# Patient Record
Sex: FEMALE | Race: OTHER | HISPANIC OR LATINO
[De-identification: names, ages, dates, MRNs, and addresses within clinical notes are randomized per-mention and may not be internally consistent; named-entity substitution may affect disease eponyms.]

---

## 2023-03-30 PROBLEM — Z00.00 ENCOUNTER FOR PREVENTIVE HEALTH EXAMINATION: Status: ACTIVE | Noted: 2023-03-30

## 2023-04-04 ENCOUNTER — ASOB RESULT (OUTPATIENT)
Age: 38
End: 2023-04-04

## 2023-04-04 ENCOUNTER — APPOINTMENT (OUTPATIENT)
Dept: ANTEPARTUM | Facility: CLINIC | Age: 38
End: 2023-04-04
Payer: COMMERCIAL

## 2023-04-04 DIAGNOSIS — N91.2 AMENORRHEA, UNSPECIFIED: ICD-10-CM

## 2023-04-04 DIAGNOSIS — O35.1XX0 MATERNAL CARE FOR (SUSPECTED) CHROMOSOMAL ABNORMALITY IN FETUS, NOT APPLICABLE OR UNSPECIFIED: ICD-10-CM

## 2023-04-04 PROCEDURE — 76813 OB US NUCHAL MEAS 1 GEST: CPT

## 2023-04-04 PROCEDURE — 36415 COLL VENOUS BLD VENIPUNCTURE: CPT

## 2023-04-07 LAB
ADDITIONAL US: NORMAL
COMMENTS: AFP: NORMAL
CRL SCAN TWIN B: NORMAL
CRL SCAN: NORMAL
CROWN RUMP LENGTH TWIN B: NORMAL
CROWN RUMP LENGTH: 59.5 MM
DOWN SYNDROME AGE RISK: NORMAL
DOWN SYNDROME INTERPRETATION: NORMAL
DOWN SYNDROME SCREENING RISK: NORMAL
GEST. AGE ON COLLECTION DATE: 12.3 WEEKS
HCG MOM: 1.04
HCG VALUE: 99.6 IU/ML
MATERNAL AGE AT EDD: 36.1 YR
NOTE: AFP: NORMAL
NT MOM TWIN B: NORMAL
NT TWIN B: NORMAL
NUCHAL TRANSLUCENCY (NT): 1.8 MM
NUCHAL TRANSLUCENCY MOM: 1.17
NUMBER OF FETUSES: 1
PAPP-A MOM: 0.86
PAPP-A VALUE: 723.2 NG/ML
RACE: NORMAL
RESULTS AFP: NORMAL
SONOGRAPHER ID#: NORMAL
SUBMIT PART 2 SAMPLE USING: NORMAL
TEST RESULTS: AFP: NORMAL
TRISOMY 18 AGE RISK: NORMAL
TRISOMY 18 INTERPRETATION: NORMAL
TRISOMY 18 SCREENING RISK: NORMAL
WEIGHT AFP: 165 LBS

## 2023-04-25 ENCOUNTER — APPOINTMENT (OUTPATIENT)
Dept: ANTEPARTUM | Facility: CLINIC | Age: 38
End: 2023-04-25
Payer: COMMERCIAL

## 2023-04-25 PROCEDURE — 36415 COLL VENOUS BLD VENIPUNCTURE: CPT

## 2023-04-28 LAB
ADDITIONAL US: NORMAL
AFP MOM: 1.59
AFP VALUE: 45 NG/ML
COLLECTED ON 2: NORMAL
COLLECTED ON: NORMAL
CRL SCAN TWIN B: NORMAL
CRL SCAN: NORMAL
CROWN RUMP LENGTH TWIN B: NORMAL
CROWN RUMP LENGTH: 59.5 MM
DIA MOM: 0.84
DIA VALUE: 130.2 PG/ML
DOWN SYNDROME AGE RISK: NORMAL
DOWN SYNDROME INTERPRETATION: NORMAL
DOWN SYNDROME SCREENING RISK: NORMAL
FIRST TRIMESTER SAMPLE: NORMAL
GEST. AGE ON COLLECTION DATE: 12.3 WEEKS
GESTATIONAL AGE: 15.3 WEEKS
HCG MOM: 1.16
HCG VALUE: 51.1 IU/ML
INSULIN DEP DIABETES: NO
MATERNAL AGE AT EDD: 36.1 YR
NT MOM TWIN B: NORMAL
NT TWIN B: NORMAL
NUCHAL TRANSLUCENCY (NT): 1.8 MM
NUCHAL TRANSLUCENCY MOM: 1.17
NUMBER OF FETUSES: 1
OPEN SPINA BIFIDA: NORMAL
OSB INTERPRETATION: NORMAL
PAPP-A MOM: 0.86
PAPP-A VALUE: 723.2 NG/ML
RACE: NORMAL
SECOND TRIMESTER SAMPLE: NORMAL
SEQUENTIAL 2 COMMENTS: NORMAL
SEQUENTIAL 2 NOTE: NORMAL
SEQUENTIAL 2 RESULTS: NORMAL
SEQUENTIAL 2 TEST RESULTS: NORMAL
SONOGRAPHER ID#: NORMAL
TRISOMY 18 AGE RISK: NORMAL
TRISOMY 18 INTERPRETATION: NORMAL
TRISOMY 18 SCREENING RISK: NORMAL
UE3 MOM: 0.71
UE3 VALUE: 0.55 NG/ML
WEIGHT AFP: 165 LBS
WEIGHT: 161 LBS

## 2023-05-02 ENCOUNTER — APPOINTMENT (OUTPATIENT)
Dept: MATERNAL FETAL MEDICINE | Facility: CLINIC | Age: 38
End: 2023-05-02
Payer: COMMERCIAL

## 2023-05-02 ENCOUNTER — ASOB RESULT (OUTPATIENT)
Age: 38
End: 2023-05-02

## 2023-05-02 VITALS — WEIGHT: 162 LBS | BODY MASS INDEX: 29.81 KG/M2 | HEIGHT: 62 IN

## 2023-05-02 DIAGNOSIS — Z83.3 FAMILY HISTORY OF DIABETES MELLITUS: ICD-10-CM

## 2023-05-02 DIAGNOSIS — Z78.9 OTHER SPECIFIED HEALTH STATUS: ICD-10-CM

## 2023-05-02 PROCEDURE — G0108 DIAB MANAGE TRN  PER INDIV: CPT | Mod: 95

## 2023-05-09 ENCOUNTER — ASOB RESULT (OUTPATIENT)
Age: 38
End: 2023-05-09

## 2023-05-09 ENCOUNTER — APPOINTMENT (OUTPATIENT)
Dept: MATERNAL FETAL MEDICINE | Facility: CLINIC | Age: 38
End: 2023-05-09
Payer: COMMERCIAL

## 2023-05-09 VITALS — WEIGHT: 163 LBS | HEIGHT: 62 IN | BODY MASS INDEX: 30 KG/M2

## 2023-05-09 PROCEDURE — G0108 DIAB MANAGE TRN  PER INDIV: CPT | Mod: 95

## 2023-05-16 ENCOUNTER — APPOINTMENT (OUTPATIENT)
Dept: MATERNAL FETAL MEDICINE | Facility: CLINIC | Age: 38
End: 2023-05-16
Payer: COMMERCIAL

## 2023-05-16 ENCOUNTER — ASOB RESULT (OUTPATIENT)
Age: 38
End: 2023-05-16

## 2023-05-16 PROCEDURE — G0108 DIAB MANAGE TRN  PER INDIV: CPT | Mod: 95

## 2023-05-23 ENCOUNTER — APPOINTMENT (OUTPATIENT)
Dept: MATERNAL FETAL MEDICINE | Facility: CLINIC | Age: 38
End: 2023-05-23
Payer: COMMERCIAL

## 2023-05-23 ENCOUNTER — ASOB RESULT (OUTPATIENT)
Age: 38
End: 2023-05-23

## 2023-05-23 ENCOUNTER — APPOINTMENT (OUTPATIENT)
Dept: ANTEPARTUM | Facility: CLINIC | Age: 38
End: 2023-05-23
Payer: COMMERCIAL

## 2023-05-23 VITALS
OXYGEN SATURATION: 99 % | SYSTOLIC BLOOD PRESSURE: 112 MMHG | DIASTOLIC BLOOD PRESSURE: 70 MMHG | BODY MASS INDEX: 29.08 KG/M2 | RESPIRATION RATE: 16 BRPM | HEART RATE: 82 BPM | WEIGHT: 158 LBS | HEIGHT: 62 IN

## 2023-05-23 DIAGNOSIS — Z3A.20 20 WEEKS GESTATION OF PREGNANCY: ICD-10-CM

## 2023-05-23 DIAGNOSIS — Z78.9 OTHER SPECIFIED HEALTH STATUS: ICD-10-CM

## 2023-05-23 PROCEDURE — 36415 COLL VENOUS BLD VENIPUNCTURE: CPT

## 2023-05-23 PROCEDURE — 76811 OB US DETAILED SNGL FETUS: CPT

## 2023-05-23 PROCEDURE — 99214 OFFICE O/P EST MOD 30 MIN: CPT

## 2023-05-23 PROCEDURE — 76817 TRANSVAGINAL US OBSTETRIC: CPT

## 2023-05-23 RX ORDER — ASPIRIN 81 MG
81 TABLET,CHEWABLE ORAL
Refills: 0 | Status: ACTIVE | COMMUNITY

## 2023-05-23 NOTE — DISCUSSION/SUMMARY
[FreeTextEntry1] : 37-year-old -0-0-2 being seen today at 20 weeks for advanced maternal age and gestational diabetes.\par \par Her obstetrical history is significant for delivery in  and  both liveborn female  weighing 7 pounds delivered at term by normal spontaneous vaginal delivery no problems or complications noted with those pregnancies.\par \par A comprehensive ultrasound was performed today and reveals a single viable intrauterine gestation with size consistent with dates.  No gross or soft markers associated with fetal aneuploidy were noted.  Vital signs today reveal a blood pressure of 112/70 immaturities 158 pounds consistent with a BMI of 28.9 kg.\par \par Advanced maternal age;\par \par Genetic counseling has been performed and report was sent under separate cover.  The patient has had Ultra-Screen evaluation, nips blood tests and sequential blood work performed which all demonstrate low risk for fetal aneuploidy.  Diagnostic testing was discussed and declined.  The patient is on low-dose aspirin taking 1 tablet daily.  She was advised to alternate 1 tablet and 2 tablets until 38 weeks.  This medication has been used to statistically prevent and/or delay the onset and severity of preeclampsia should it occur in patients with certain risk factors.\par \par Gestational diabetes;\par \par Dietary consultation has been performed and report was sent under separate cover.  Evaluation of patient's diabetic flowsheets demonstrates good control of fasting and postprandial blood sugars.  Medical intervention is not recommended.  Hemoglobin A1c was obtained in our office today.  A growth scan in 1 month is recommended.  In addition a fetal echo will be performed next 1 to 2 weeks.  Problems and complications related to diabetic pregnancy including fetal macrosomia, increased risk for operative vaginal delivery and  section, shoulder dystocia with associate morbidity mortality and increased incidence of  intensive care admission were discussed.  All the above was discussed with the patient all of her questions were answered.\par \par COVID-19 vaccination;\par \par COVID-19 vaccination pregnancy was discussed.  The patient completed initial vaccination with the Pfizer vaccine.  Risk and benefits of booster vaccination in pregnancy were discussed and after all the patient's questions were answered the patient has declined booster vaccination in pregnancy.  She understands she has certain comorbidities that put her increased risk for complications with COVID infection.  She will contact your office should she have a COVID infection discussed various treatment options.\par \par Her mother has diabetes.  She has no previous medical or surgical history.  She has no known allergies to medications and denies alcohol, tobacco or drug use.\par \par I spent a total of 36 minutes reviewing the patient's prenatal record, prenatal blood work, outside medical records, previous consultations and ultrasound reports counseling and coordinating care.\par \par Recommendations;\par \par 1.  Continue current ADA diet and home glucose monitoring.\par 2.  Fetal echo in the next 1 to 2 weeks is recommended.\par 3.  Follow-up dietary consultation as scheduled.\par 4.  Hemoglobin A1c obtained in our office today.\par 5.  Growth scan in 1 months recommended.\par 6.  Continue low-dose aspirin.\par 7.  Follow-up maternal-fetal medicine consultation as clinically indicated.

## 2023-05-24 ENCOUNTER — APPOINTMENT (OUTPATIENT)
Dept: ANTEPARTUM | Facility: CLINIC | Age: 38
End: 2023-05-24

## 2023-05-24 LAB
ESTIMATED AVERAGE GLUCOSE: 111 MG/DL
HBA1C MFR BLD HPLC: 5.5 %

## 2023-06-06 ENCOUNTER — ASOB RESULT (OUTPATIENT)
Age: 38
End: 2023-06-06

## 2023-06-06 ENCOUNTER — APPOINTMENT (OUTPATIENT)
Dept: MATERNAL FETAL MEDICINE | Facility: CLINIC | Age: 38
End: 2023-06-06
Payer: COMMERCIAL

## 2023-06-06 VITALS — HEIGHT: 62 IN | BODY MASS INDEX: 28.71 KG/M2 | WEIGHT: 156 LBS

## 2023-06-06 PROCEDURE — G0108 DIAB MANAGE TRN  PER INDIV: CPT | Mod: 95

## 2023-06-13 ENCOUNTER — APPOINTMENT (OUTPATIENT)
Dept: PEDIATRIC CARDIOLOGY | Facility: CLINIC | Age: 38
End: 2023-06-13
Payer: COMMERCIAL

## 2023-06-13 DIAGNOSIS — O99.810 ABNORMAL GLUCOSE COMPLICATING PREGNANCY: ICD-10-CM

## 2023-06-13 DIAGNOSIS — O09.522 SUPERVISION OF ELDERLY MULTIGRAVIDA, SECOND TRIMESTER: ICD-10-CM

## 2023-06-13 DIAGNOSIS — Z3A.23 23 WEEKS GESTATION OF PREGNANCY: ICD-10-CM

## 2023-06-13 DIAGNOSIS — O35.9XX0 MATERNAL CARE FOR (SUSPECTED) FETAL ABNORMALITY AND DAMAGE, UNSPECIFIED, NOT APPLICABLE OR UNSPECIFIED: ICD-10-CM

## 2023-06-13 PROCEDURE — 76827 ECHO EXAM OF FETAL HEART: CPT

## 2023-06-13 PROCEDURE — 93325 DOPPLER ECHO COLOR FLOW MAPG: CPT | Mod: 59

## 2023-06-13 PROCEDURE — 76821 MIDDLE CEREBRAL ARTERY ECHO: CPT

## 2023-06-13 PROCEDURE — 76825 ECHO EXAM OF FETAL HEART: CPT

## 2023-06-13 PROCEDURE — 99204 OFFICE O/P NEW MOD 45 MIN: CPT | Mod: 25

## 2023-06-13 PROCEDURE — 76820 UMBILICAL ARTERY ECHO: CPT

## 2023-06-14 ENCOUNTER — APPOINTMENT (OUTPATIENT)
Dept: MATERNAL FETAL MEDICINE | Facility: CLINIC | Age: 38
End: 2023-06-14
Payer: COMMERCIAL

## 2023-06-14 ENCOUNTER — ASOB RESULT (OUTPATIENT)
Age: 38
End: 2023-06-14

## 2023-06-14 PROCEDURE — 99212 OFFICE O/P EST SF 10 MIN: CPT | Mod: 95

## 2023-06-20 ENCOUNTER — ASOB RESULT (OUTPATIENT)
Age: 38
End: 2023-06-20

## 2023-06-20 ENCOUNTER — APPOINTMENT (OUTPATIENT)
Dept: ANTEPARTUM | Facility: CLINIC | Age: 38
End: 2023-06-20
Payer: COMMERCIAL

## 2023-06-20 PROCEDURE — 76816 OB US FOLLOW-UP PER FETUS: CPT

## 2023-06-27 ENCOUNTER — APPOINTMENT (OUTPATIENT)
Dept: MATERNAL FETAL MEDICINE | Facility: CLINIC | Age: 38
End: 2023-06-27
Payer: COMMERCIAL

## 2023-06-27 ENCOUNTER — ASOB RESULT (OUTPATIENT)
Age: 38
End: 2023-06-27

## 2023-06-27 PROCEDURE — G0108 DIAB MANAGE TRN  PER INDIV: CPT | Mod: 95

## 2023-07-18 ENCOUNTER — APPOINTMENT (OUTPATIENT)
Dept: ANTEPARTUM | Facility: CLINIC | Age: 38
End: 2023-07-18
Payer: COMMERCIAL

## 2023-07-18 ENCOUNTER — ASOB RESULT (OUTPATIENT)
Age: 38
End: 2023-07-18

## 2023-07-18 PROCEDURE — 76816 OB US FOLLOW-UP PER FETUS: CPT

## 2023-07-25 ENCOUNTER — ASOB RESULT (OUTPATIENT)
Age: 38
End: 2023-07-25

## 2023-07-25 ENCOUNTER — APPOINTMENT (OUTPATIENT)
Dept: MATERNAL FETAL MEDICINE | Facility: CLINIC | Age: 38
End: 2023-07-25
Payer: COMMERCIAL

## 2023-07-25 PROCEDURE — G0108 DIAB MANAGE TRN  PER INDIV: CPT | Mod: 95

## 2023-08-15 ENCOUNTER — APPOINTMENT (OUTPATIENT)
Dept: ANTEPARTUM | Facility: CLINIC | Age: 38
End: 2023-08-15
Payer: COMMERCIAL

## 2023-08-15 ENCOUNTER — ASOB RESULT (OUTPATIENT)
Age: 38
End: 2023-08-15

## 2023-08-15 PROCEDURE — 76816 OB US FOLLOW-UP PER FETUS: CPT

## 2023-08-22 ENCOUNTER — APPOINTMENT (OUTPATIENT)
Dept: MATERNAL FETAL MEDICINE | Facility: CLINIC | Age: 38
End: 2023-08-22
Payer: COMMERCIAL

## 2023-08-22 ENCOUNTER — ASOB RESULT (OUTPATIENT)
Age: 38
End: 2023-08-22

## 2023-08-22 VITALS — BODY MASS INDEX: 27.97 KG/M2 | HEIGHT: 62 IN | WEIGHT: 152 LBS

## 2023-08-22 PROCEDURE — G0108 DIAB MANAGE TRN  PER INDIV: CPT | Mod: 95

## 2023-09-12 ENCOUNTER — ASOB RESULT (OUTPATIENT)
Age: 38
End: 2023-09-12

## 2023-09-12 ENCOUNTER — APPOINTMENT (OUTPATIENT)
Dept: ANTEPARTUM | Facility: CLINIC | Age: 38
End: 2023-09-12
Payer: COMMERCIAL

## 2023-09-12 PROCEDURE — 76816 OB US FOLLOW-UP PER FETUS: CPT

## 2023-09-12 PROCEDURE — 76819 FETAL BIOPHYS PROFIL W/O NST: CPT

## 2023-09-19 ENCOUNTER — ASOB RESULT (OUTPATIENT)
Age: 38
End: 2023-09-19

## 2023-09-19 ENCOUNTER — APPOINTMENT (OUTPATIENT)
Dept: ANTEPARTUM | Facility: CLINIC | Age: 38
End: 2023-09-19
Payer: COMMERCIAL

## 2023-09-19 ENCOUNTER — APPOINTMENT (OUTPATIENT)
Dept: MATERNAL FETAL MEDICINE | Facility: CLINIC | Age: 38
End: 2023-09-19
Payer: COMMERCIAL

## 2023-09-19 PROCEDURE — 76818 FETAL BIOPHYS PROFILE W/NST: CPT

## 2023-09-19 PROCEDURE — G0108 DIAB MANAGE TRN  PER INDIV: CPT | Mod: 95

## 2023-09-26 ENCOUNTER — APPOINTMENT (OUTPATIENT)
Dept: ANTEPARTUM | Facility: CLINIC | Age: 38
End: 2023-09-26

## 2023-10-03 ENCOUNTER — APPOINTMENT (OUTPATIENT)
Dept: ANTEPARTUM | Facility: CLINIC | Age: 38
End: 2023-10-03

## 2023-10-10 ENCOUNTER — APPOINTMENT (OUTPATIENT)
Dept: ANTEPARTUM | Facility: CLINIC | Age: 38
End: 2023-10-10

## 2023-10-10 ENCOUNTER — TRANSCRIPTION ENCOUNTER (OUTPATIENT)
Age: 38
End: 2023-10-10

## 2023-10-10 ENCOUNTER — INPATIENT (INPATIENT)
Facility: HOSPITAL | Age: 38
LOS: 1 days | Discharge: HOME | End: 2023-10-12
Attending: OBSTETRICS & GYNECOLOGY | Admitting: OBSTETRICS & GYNECOLOGY
Payer: COMMERCIAL

## 2023-10-10 VITALS
HEART RATE: 103 BPM | DIASTOLIC BLOOD PRESSURE: 78 MMHG | RESPIRATION RATE: 18 BRPM | TEMPERATURE: 98 F | SYSTOLIC BLOOD PRESSURE: 117 MMHG

## 2023-10-10 DIAGNOSIS — O99.810 ABNORMAL GLUCOSE COMPLICATING PREGNANCY: ICD-10-CM

## 2023-10-10 LAB
ABO RH CONFIRMATION: SIGNIFICANT CHANGE UP
BASOPHILS # BLD AUTO: 0.04 K/UL — SIGNIFICANT CHANGE UP (ref 0–0.2)
BASOPHILS NFR BLD AUTO: 0.4 % — SIGNIFICANT CHANGE UP (ref 0–2)
BLD GP AB SCN SERPL QL: SIGNIFICANT CHANGE UP
EOSINOPHIL # BLD AUTO: 0.02 K/UL — SIGNIFICANT CHANGE UP (ref 0–0.5)
EOSINOPHIL NFR BLD AUTO: 0.2 % — SIGNIFICANT CHANGE UP (ref 0–6)
HCT VFR BLD CALC: 43.4 % — SIGNIFICANT CHANGE UP (ref 34.5–45)
HGB BLD-MCNC: 14.9 G/DL — SIGNIFICANT CHANGE UP (ref 11.5–15.5)
IMM GRANULOCYTES NFR BLD AUTO: 0.7 % — SIGNIFICANT CHANGE UP (ref 0–0.9)
LYMPHOCYTES # BLD AUTO: 2.23 K/UL — SIGNIFICANT CHANGE UP (ref 1–3.3)
LYMPHOCYTES # BLD AUTO: 24.4 % — SIGNIFICANT CHANGE UP (ref 13–44)
MCHC RBC-ENTMCNC: 33.2 PG — SIGNIFICANT CHANGE UP (ref 27–34)
MCHC RBC-ENTMCNC: 34.3 GM/DL — SIGNIFICANT CHANGE UP (ref 32–36)
MCV RBC AUTO: 96.7 FL — SIGNIFICANT CHANGE UP (ref 80–100)
MONOCYTES # BLD AUTO: 0.57 K/UL — SIGNIFICANT CHANGE UP (ref 0–0.9)
MONOCYTES NFR BLD AUTO: 6.2 % — SIGNIFICANT CHANGE UP (ref 2–14)
NEUTROPHILS # BLD AUTO: 6.23 K/UL — SIGNIFICANT CHANGE UP (ref 1.8–7.4)
NEUTROPHILS NFR BLD AUTO: 68.1 % — SIGNIFICANT CHANGE UP (ref 43–77)
PLATELET # BLD AUTO: 247 K/UL — SIGNIFICANT CHANGE UP (ref 150–400)
RBC # BLD: 4.49 M/UL — SIGNIFICANT CHANGE UP (ref 3.8–5.2)
RBC # FLD: 13 % — SIGNIFICANT CHANGE UP (ref 10.3–14.5)
WBC # BLD: 9.15 K/UL — SIGNIFICANT CHANGE UP (ref 3.8–10.5)
WBC # FLD AUTO: 9.15 K/UL — SIGNIFICANT CHANGE UP (ref 3.8–10.5)

## 2023-10-10 PROCEDURE — 88302 TISSUE EXAM BY PATHOLOGIST: CPT | Mod: 26

## 2023-10-10 PROCEDURE — 88307 TISSUE EXAM BY PATHOLOGIST: CPT | Mod: 26

## 2023-10-10 PROCEDURE — 58605 DIVISION OF FALLOPIAN TUBE: CPT | Mod: GC

## 2023-10-10 RX ORDER — PRAMOXINE HYDROCHLORIDE 150 MG/15G
1 AEROSOL, FOAM RECTAL EVERY 4 HOURS
Refills: 0 | Status: DISCONTINUED | OUTPATIENT
Start: 2023-10-10 | End: 2023-10-12

## 2023-10-10 RX ORDER — HYDROCORTISONE 1 %
1 OINTMENT (GRAM) TOPICAL EVERY 6 HOURS
Refills: 0 | Status: DISCONTINUED | OUTPATIENT
Start: 2023-10-10 | End: 2023-10-12

## 2023-10-10 RX ORDER — BENZOCAINE 10 %
1 GEL (GRAM) MUCOUS MEMBRANE EVERY 6 HOURS
Refills: 0 | Status: DISCONTINUED | OUTPATIENT
Start: 2023-10-10 | End: 2023-10-12

## 2023-10-10 RX ORDER — DIBUCAINE 1 %
1 OINTMENT (GRAM) RECTAL EVERY 6 HOURS
Refills: 0 | Status: DISCONTINUED | OUTPATIENT
Start: 2023-10-10 | End: 2023-10-12

## 2023-10-10 RX ORDER — TETANUS TOXOID, REDUCED DIPHTHERIA TOXOID AND ACELLULAR PERTUSSIS VACCINE, ADSORBED 5; 2.5; 8; 8; 2.5 [IU]/.5ML; [IU]/.5ML; UG/.5ML; UG/.5ML; UG/.5ML
0.5 SUSPENSION INTRAMUSCULAR ONCE
Refills: 0 | Status: DISCONTINUED | OUTPATIENT
Start: 2023-10-10 | End: 2023-10-12

## 2023-10-10 RX ORDER — CITRIC ACID/SODIUM CITRATE 300-500 MG
30 SOLUTION, ORAL ORAL ONCE
Refills: 0 | Status: DISCONTINUED | OUTPATIENT
Start: 2023-10-10 | End: 2023-10-10

## 2023-10-10 RX ORDER — OXYTOCIN 10 UNIT/ML
41.67 VIAL (ML) INJECTION
Qty: 20 | Refills: 0 | Status: DISCONTINUED | OUTPATIENT
Start: 2023-10-10 | End: 2023-10-12

## 2023-10-10 RX ORDER — IBUPROFEN 200 MG
600 TABLET ORAL EVERY 6 HOURS
Refills: 0 | Status: COMPLETED | OUTPATIENT
Start: 2023-10-10 | End: 2024-09-07

## 2023-10-10 RX ORDER — OXYTOCIN 10 UNIT/ML
333.33 VIAL (ML) INJECTION
Qty: 20 | Refills: 0 | Status: COMPLETED | OUTPATIENT
Start: 2023-10-10 | End: 2023-10-10

## 2023-10-10 RX ORDER — SODIUM CHLORIDE 9 MG/ML
3 INJECTION INTRAMUSCULAR; INTRAVENOUS; SUBCUTANEOUS EVERY 8 HOURS
Refills: 0 | Status: DISCONTINUED | OUTPATIENT
Start: 2023-10-10 | End: 2023-10-12

## 2023-10-10 RX ORDER — SIMETHICONE 80 MG/1
80 TABLET, CHEWABLE ORAL EVERY 4 HOURS
Refills: 0 | Status: DISCONTINUED | OUTPATIENT
Start: 2023-10-10 | End: 2023-10-12

## 2023-10-10 RX ORDER — DIPHENHYDRAMINE HCL 50 MG
25 CAPSULE ORAL EVERY 6 HOURS
Refills: 0 | Status: DISCONTINUED | OUTPATIENT
Start: 2023-10-10 | End: 2023-10-12

## 2023-10-10 RX ORDER — CHLORHEXIDINE GLUCONATE 213 G/1000ML
1 SOLUTION TOPICAL DAILY
Refills: 0 | Status: DISCONTINUED | OUTPATIENT
Start: 2023-10-10 | End: 2023-10-10

## 2023-10-10 RX ORDER — ACETAMINOPHEN 500 MG
2 TABLET ORAL
Qty: 40 | Refills: 0
Start: 2023-10-10 | End: 2023-10-14

## 2023-10-10 RX ORDER — KETOROLAC TROMETHAMINE 30 MG/ML
30 SYRINGE (ML) INJECTION ONCE
Refills: 0 | Status: DISCONTINUED | OUTPATIENT
Start: 2023-10-10 | End: 2023-10-10

## 2023-10-10 RX ORDER — ACETAMINOPHEN 500 MG
975 TABLET ORAL
Refills: 0 | Status: DISCONTINUED | OUTPATIENT
Start: 2023-10-10 | End: 2023-10-12

## 2023-10-10 RX ORDER — MAGNESIUM HYDROXIDE 400 MG/1
30 TABLET, CHEWABLE ORAL
Refills: 0 | Status: DISCONTINUED | OUTPATIENT
Start: 2023-10-10 | End: 2023-10-12

## 2023-10-10 RX ORDER — SODIUM CHLORIDE 9 MG/ML
1000 INJECTION, SOLUTION INTRAVENOUS
Refills: 0 | Status: DISCONTINUED | OUTPATIENT
Start: 2023-10-10 | End: 2023-10-10

## 2023-10-10 RX ORDER — IBUPROFEN 200 MG
600 TABLET ORAL EVERY 6 HOURS
Refills: 0 | Status: DISCONTINUED | OUTPATIENT
Start: 2023-10-10 | End: 2023-10-12

## 2023-10-10 RX ORDER — IBUPROFEN 200 MG
1 TABLET ORAL
Qty: 20 | Refills: 0
Start: 2023-10-10 | End: 2023-10-14

## 2023-10-10 RX ORDER — AER TRAVELER 0.5 G/1
1 SOLUTION RECTAL; TOPICAL EVERY 4 HOURS
Refills: 0 | Status: DISCONTINUED | OUTPATIENT
Start: 2023-10-10 | End: 2023-10-12

## 2023-10-10 RX ORDER — LANOLIN
1 OINTMENT (GRAM) TOPICAL EVERY 6 HOURS
Refills: 0 | Status: DISCONTINUED | OUTPATIENT
Start: 2023-10-10 | End: 2023-10-12

## 2023-10-10 RX ADMIN — Medication 30 MILLIGRAM(S): at 18:01

## 2023-10-10 RX ADMIN — Medication 600 MILLIGRAM(S): at 23:33

## 2023-10-10 RX ADMIN — Medication 975 MILLIGRAM(S): at 21:01

## 2023-10-10 RX ADMIN — Medication 1000 MILLIUNIT(S)/MIN: at 18:00

## 2023-10-10 RX ADMIN — SODIUM CHLORIDE 125 MILLILITER(S): 9 INJECTION, SOLUTION INTRAVENOUS at 12:30

## 2023-10-10 NOTE — OB RN DELIVERY SUMMARY - NSSELHIDDEN_OBGYN_ALL_OB_FT
[NS_DeliveryAttending1_OBGYN_ALL_OB_FT:[email or web address]=],[NS_DeliveryRN_OBGYN_ALL_OB_FT:[id number]=]

## 2023-10-10 NOTE — OB RN DELIVERY SUMMARY - NS_NEWBORNAMRN_OBGYN_ALL_OB_FT
[id number] Ketoconazole Counseling:   Patient counseled regarding improving absorption with orange juice.  Adverse effects include but are not limited to breast enlargement, headache, diarrhea, nausea, upset stomach, liver function test abnormalities, taste disturbance, and stomach pain.  There is a rare possibility of liver failure that can occur when taking ketoconazole. The patient understands that monitoring of LFTs may be required, especially at baseline. The patient verbalized understanding of the proper use and possible adverse effects of ketoconazole.  All of the patient's questions and concerns were addressed.

## 2023-10-10 NOTE — DISCHARGE NOTE OB - CARE PROVIDER_API CALL
Jackson Prieto  Obstetrics and Gynecology  [facility] [place] [address]  Phone: [phone]  Fax: [phone]  Established Patient  Follow Up Time: 2 weeks

## 2023-10-10 NOTE — OB PROVIDER H&P - NSHPPHYSICALEXAM_GEN_ALL_CORE
OBJECTIVE:  Physical Exam:  Gen: NAD, well-appearing, AAOx3   Abd: Soft, gravid  Ext: non-tender, non-edematous  TO BE COMPLETED  SVE:    Bedside sono:  FHT:  [place]: OBJECTIVE:  Physical Exam:  Gen: NAD, well-appearing, AAOx3   Abd: Soft, gravid  Ext: non-tender, non-edematous  TO BE COMPLETED  SVE: 0.5/50/-3    Bedside sono: cephalic, placenta fundal/posterior  FHT: Baseline 140bpm, moderate variability, +accel, - decel  [place]: No ctx on monitor

## 2023-10-10 NOTE — DISCHARGE NOTE OB - MEDICATION SUMMARY - MEDICATIONS TO TAKE
I will START or STAY ON the medications listed below when I get home from the hospital:     mg oral tablet  -- 1 tab(s) by mouth every 6 hours  -- Indication: For As needed for pain    acetaminophen 500 mg oral tablet  -- 2 tab(s) by mouth every 6 hours  -- Indication: For As needed for pain

## 2023-10-10 NOTE — OB PROVIDER DELIVERY SUMMARY - NSPROVIDERDELIVERYNOTE_OBGYN_ALL_OB_FT
Patient noted to be fully dilated, and after a period of pushing, patient was prepped and draped for delivery. In conjunction with maternal effort, she delivered a viable male infant. Head delivered FLAKO, no nuchal cord. After 30 sec, delayed cord clamping was performed then  was provided to parent for skin-to-skin. Cord blood collected for blood gas and G6PD deficiency testing. Placenta delivered spontaneously and intact, no gross pathology noted, placenta sent to pathology for SGA. Perineum and vagina were inspected and no laceration was noted. Excellent hemostasis obtained.     EBL 132cc    Sex: M, Delivery Time: 1726,  Weight: 2995g, APGARs: 9/9

## 2023-10-10 NOTE — DISCHARGE NOTE OB - NS MD DC FALL RISK RISK
For information on Fall & Injury Prevention, visit: [email or web address] OR  [email or web address] OR  https://www.cdc.gov/steadi/patient.html

## 2023-10-10 NOTE — OB NEONATOLOGY/PEDIATRICIAN DELIVERY SUMMARY - NSPEDSNEONOTESA_OBGYN_ALL_OB_FT
Called by Dr. Prieto to attend this  at 40 weeks for meconium and cat 2 tracing. Mother is a 37yo  with glucose intolerance, serologies negative, GBS negative, blood type O+. Baby emerged with good tone, cry with stimulation, cord clamped at 30 seconds and brought to warmer, warmed, dried and stimulated, Apgars 9/9.

## 2023-10-10 NOTE — OB PROVIDER H&P - ASSESSMENT
A/P: MANINDER MICHAUD is a 38 year old  at 40w by LMP/1st trimester US who presents to L&D for elective IOL at term.   - Admit to L&D  - Consent  - Admission labs ordered  - IV fluids  - Labor: Intact  - Fetus: Cat I tracing. Continuous toco and fetal monitoring.   - GBS: Negative    to be Discussed with Dr. Prieto A/P: MANINDER MICHAUD is a 38 year old  at 40w by LMP/1st trimester US who presents to L&D for elective IOL at term.   - Admit to L&D  - Consent  - Admission labs ordered  - IV fluids  - Labor: Intact  - Fetus: Cat I tracing. Continuous toco and fetal monitoring.   - GBS: Negative  - initiate induction with Vaginal Cytotec and Cook Balloon    Discussed with Dr. Prieto

## 2023-10-10 NOTE — OB PROVIDER LABOR PROGRESS NOTE - NS_OBIHIFHRDETAILS_OBGYN_ALL_OB_FT
Baseline 150bpm,moderate variability, +accel, 1 isolated decel
Baseline 130bpm, moderate variability, +accel, -decel
135bpm, moderate variability, +Acels no decels

## 2023-10-10 NOTE — OB PROVIDER LABOR PROGRESS NOTE - NS_SUBJECTIVE/OBJECTIVE_OBGYN_ALL_OB_FT
Pt seen bedside
Patient seen and examined for AROM
Patient seen and examined for placement of VCyto1 and CB

## 2023-10-10 NOTE — OB RN PATIENT PROFILE - FALL HARM RISK - UNIVERSAL INTERVENTIONS
Bed in lowest position, wheels locked, appropriate side rails in place/Call bell, personal items and telephone in reach/Instruct patient to call for assistance before getting out of bed or chair/Non-slip footwear when patient is out of bed/[place] to call system/Physically safe environment - no spills, clutter or unnecessary equipment/Purposeful Proactive Rounding/Room/bathroom lighting operational, light cord in reach

## 2023-10-10 NOTE — OB PROVIDER LABOR PROGRESS NOTE - ASSESSMENT
Exam as above  CB balloon placed 80/80  Vycto#1 placed  
Exam as above  Patient AROM revealed minimal fluid, meconium stained  Patient hayde without additional induction agents, continue to monitor    
cook balloon out in vagina, deflated and removed  4/80/-3  c/w current management

## 2023-10-10 NOTE — OB PROVIDER DELIVERY SUMMARY - NSLOWPPHRISK_OBGYN_A_OB
No previous uterine incision/[place] Pregnancy/Less than or equal to 4 previous vaginal births/No known bleeding disorder/No history of postpartum hemorrhage/No other PPH risks indicated

## 2023-10-10 NOTE — OB PROVIDER H&P - HISTORY OF PRESENT ILLNESS
Patient is a 38 year old  at 40w by LMP/1st trimester US who presents to L&D for elective IOL at term.      FRANKI:  10/10/2023   LMP: 1/3/2023     Pregnancy course:   Glucose intolerance   AMA     Obhx: NSVDx2 term 2005; 2010   Gynhx: Denies   Pmhx: Denies   Pshx: Denies   Meds: PNV, ASA   Allergies: NKDA   Social Hx: Denies     Ultrasound:  Vertex posterior   EFW: 2517g    PPBC:BTL consents signed   Patient is a 38 year old  at 40w by LMP/1st trimester US who presents to L&D for elective IOL at term. Patient denies any contractions, VB, LOF, has fetal movement. She has no abdominal pain, cp, SOB at this time. She denies any history of hemorrhage in her previous pregnancies. She reports her home FS have been in the 80s-100s, denies any weakness, fevers, chills.      FARNKI: 10/10/2023   LMP: 1/3/2023   Patient receives care at [facility]  Pregnancy course:   Glucose intolerance   AMA     Obhx: NSVDx2 term 2005; 2010, uncomplicated  Gynhx: Denies   Pmhx: Denies   Pshx: Denies   Meds: PNV, ASA   Allergies: NKDA   Social Hx: Denies     Ultrasound:  Vertex posterior   EFW: 2517g    PPBC:BTL consents signed

## 2023-10-10 NOTE — DISCHARGE NOTE OB - PATIENT PORTAL LINK FT
You can access the FollowMyHealth Patient Portal offered by [facility] by registering at the following website: [email or web address]. By joining [facility]’s FollowMyHealth portal, you will also be able to view your health information using other applications (apps) compatible with our system.

## 2023-10-10 NOTE — OB RN DELIVERY SUMMARY - NS_SEPSISRSKCALC_OBGYN_ALL_OB_FT
EOS calculated successfully. EOS Risk Factor: 0.5/1000 live births ([facility] national incidence); GA=40w;Temp=98.24; ROM=0.317; GBS='Negative'; Antibiotics='No antibiotics or any antibiotics < 2 hrs prior to birth'

## 2023-10-10 NOTE — OB RN DELIVERY SUMMARY - NS_PLACENTA_OBGYN_ALL_OB_DT
"-Coronavirus (COVID-19) Notification    Pt stated someone called him already, he knows about 10d isolation, touched base about his proc w/ doctor. Didn't have questions for me, other then some remarks of \"seems like everything is changing all the time and nobody really knows anything about this...\" I apologized for my call, mentioned to him to make sure he contact his pcp for any further concerns/questions he may have. Pt understood, stated he had already looked at his mychart.    Caller Name (Patient, parent, daughter/son, grandparent, etc)  Pt    Reason for call  Notify of Positive Coronavirus (COVID-19) lab results, assess symptoms,  review [facility] recommendations    Lab Result    Lab test:  2019-nCoV rRt-PCR or SARS-CoV-2 PCR    Oropharyngeal AND/OR nasopharyngeal swabs is POSITIVE for 2019-nCoV RNA/SARS-COV-2 PCR (COVID-19 virus)    RN Recommendations/Instructions per [facility] Coronavirus COVID-19 recommendations    Brief introduction script  Introduce self then review script:  \"I am calling on behalf of [email or web address].  We were notified that your Coronavirus test (COVID-19) for was POSITIVE for the virus.  I have some information to relay to you but first I wanted to mention that the [place] Dept of Health will be contacting you shortly [it's possible MD already called Patient] to talk to you more about how you are feeling and other people you have had contact with who might now also have the virus.  Also, [facility] is Partnering with the [facility] for Covid-19 research, you may be contacted directly by research staff.\"    Assessment (Inquire about Patient's current symptoms)   Assessment   Current Symptoms at time of phone call: (if no symptoms, document No symptoms] none   Symptoms onset (if applicable) n/a     If at time of call, Patients symptoms hare worsened, the Patient should contact 911 or have someone drive them to Emergency Dept promptly:      If Patient calling 911, " "inform 911 personal that you have tested positive for the Coronavirus (COVID-19).  Place mask on and await 911 to arrive.    If Emergency Dept, If possible, please have another adult drive you to the Emergency Dept but you need to wear mask when in contact with other people.      Monoclonal Antibody Administration    You may be eligible to receive a new treatment with a monoclonal antibody for preventing hospitalization in patients at high risk for complications from COVID-19.   This medication is still experimental and available on a limited basis; it is given through an IV and must be given at an infusion center. Please note that not all people who are eligible will receive the medication since it is in limited supply.     Are you interested in being considered for this medication?  No.   Does the patient fit the criteria: No asx    If patient qualifies based on above criteria:  \"You will be contacted if you are selected to receive this treatment in the next 1-2 business days.   This is time sensitive and if you are not selected in the next 1-2 business days, you will not receive the medication.  If you do not receive a call to schedule, you have not been selected.\"      Review information with Patient    Your result was positive. This means you have COVID-19 (coronavirus).  We have sent you a letter that reviews the information that I'll be reviewing with you now.    How can I protect others?    If you have symptoms: stay home and away from others (self-isolate) until:    You've had no fever--and no medicine that reduces fever--for 1 full day (24 hours). And       Your other symptoms have gotten better. For example, your cough or breathing has improved. And     At least 10 days have passed since your symptoms started. (If you've been told by a doctor that you have a weak immune system, wait 20 days.)     If you don't have symptoms: Stay home and away from others (self-isolate) until at least 10 days have passed " since your first positive COVID-19 test. (Date test collected)    During this time:    Stay in your own room, including for meals. Use your own bathroom if you can.    Stay away from others in your home. No hugging, kissing or shaking hands. No visitors.     Don't go to work, school or anywhere else.     Clean  high touch  surfaces often (doorknobs, counters, handles, etc.). Use a household cleaning spray or wipes. You'll find a full list on the EPA website at www.epa.gov/pesticide-registration/list-n-disinfectants-use-against-sars-cov-2.     Cover your mouth and nose with a mask, tissue or other face covering to avoid spreading germs.    Wash your hands and face often with soap and water.    Make a list of people you have been in close contact with recently, even if either of you wore a face covering.   ; Start your list from 2 days before you became ill or had a positive test.  ; Include anyone that was within 6 feet of you for a cumulative total of 15 minutes or more in 24 hours. (Example: if you sat next to Gabriel for 5 minutes in the morning and 10 minutes in the afternoon, then you were in close contact for 15 minutes total that day. Gabriel would be added to your list.)    A public health worker will call or text you. It is important that you answer. They will ask you questions about possible exposures to COVID-19, such as people you have been in direct contact with and places you have visited.    Tell the people on your list that you have COVID-19; they should stay away from others for 14 days starting from the last time they were in contact with you (unless you are told something different from a public health worker).     Caregivers in these groups are at risk for severe illness due to COVID-19:  o People 65 years and older  o People who live in a nursing home or long-term care facility  o People with chronic disease (lung, heart, cancer, diabetes, kidney, liver, immunologic)  o People who have a weakened  immune system, including those who:  - Are in cancer treatment  - Take medicine that weakens the immune system, such as corticosteroids  - Had a bone marrow or organ transplant  - Have an immune deficiency  - Have poorly controlled HIV or AIDS  - Are obese (body mass index of 40 or higher)  - Smoke regularly    Caregivers should wear gloves while washing dishes, handling laundry and cleaning bedrooms and bathrooms.    Wash and dry laundry with special caution. Don't shake dirty laundry, and use the warmest water setting you can.    If you have a weakened immune system, ask your doctor about other actions you should take.    For more tips, go to www.cdc.gov/coronavirus/2019-ncov/downloads/10Things.pdf.    You should not go back to work until you meet the guidelines above for ending your home isolation. You don't need to be retested for COVID-19 before going back to work--studies show that you won't spread the virus if it's been at least 10 days since your symptoms started (or 20 days, if you have a weak immune system).    Employers: This document serves as formal notice of your employee's medical guidelines for going back to work. They must meet the above guidelines before going back to work in person.    How can I take care of myself?    1. Get lots of rest. Drink extra fluids (unless a doctor has told you not to).    2. Take Tylenol (acetaminophen) for fever or pain. If you have liver or kidney problems, ask your family doctor if it's okay to take Tylenol.     Take either:     650 mg (two 325 mg pills) every 4 to 6 hours, or     1,000 mg (two 500 mg pills) every 8 hours as needed.     Note: Don't take more than 3,000 mg in one day. Acetaminophen is found in many medicines (both prescribed and over-the-counter medicines). Read all labels to be sure you don't take too much.    For children, check the Tylenol bottle for the right dose (based on their age or weight).    3. If you have other health problems (like  cancer, heart failure, an organ transplant or severe kidney disease): Call your specialty clinic if you don't feel better in the next 2 days.    4. Know when to call 911: Emergency warning signs include:    Trouble breathing or shortness of breath    Pain or pressure in the chest that doesn't go away    Feeling confused like you haven't felt before, or not being able to wake up    Bluish-colored lips or face    5. Sign up for [employer]. We know it's scary to hear that you have COVID-19. We want to track your symptoms to make sure you're okay over the next 2 weeks. Please look for an email from [employer]--this is a free, online program that we'll use to keep in touch. To sign up, follow the link in the email. Learn more at [email or web address].    Where can I get more information?    [facility]: [email or web address]    Coronavirus Basics: www.health.[facility].[place]./diseases/coronavirus/basics.html    What to Do If You're Sick: www.cdc.gov/coronavirus/2019-ncov/about/steps-when-sick.html    Ending Home Isolation: www.cdc.gov/coronavirus/2019-ncov/hcp/disposition-in-home-patients.html     Caring for Someone with COVID-19: www.cdc.gov/coronavirus/2019-ncov/if-you-are-sick/care-for-someone.html     [facility] clinical trials (COVID-19 research studies): [email or web address].[email or web address]/n-clinical-[email or web address]     A Positive COVID-19 letter will be sent via [employer] or the mail. (Exception, no letters sent to Presurgerical/Preprocedure Patients)    Radha Parikh     10-Oct-2023 17:30

## 2023-10-11 ENCOUNTER — TRANSCRIPTION ENCOUNTER (OUTPATIENT)
Age: 38
End: 2023-10-11

## 2023-10-11 LAB
HCT VFR BLD CALC: 38.3 % — SIGNIFICANT CHANGE UP (ref 34.5–45)
HGB BLD-MCNC: 13.1 G/DL — SIGNIFICANT CHANGE UP (ref 11.5–15.5)

## 2023-10-11 RX ORDER — FAMOTIDINE 10 MG/ML
20 INJECTION INTRAVENOUS ONCE
Refills: 0 | Status: COMPLETED | OUTPATIENT
Start: 2023-10-11 | End: 2023-10-11

## 2023-10-11 RX ORDER — CITRIC ACID/SODIUM CITRATE 300-500 MG
30 SOLUTION, ORAL ORAL ONCE
Refills: 0 | Status: COMPLETED | OUTPATIENT
Start: 2023-10-11 | End: 2023-10-11

## 2023-10-11 RX ORDER — SODIUM CHLORIDE 9 MG/ML
1000 INJECTION, SOLUTION INTRAVENOUS ONCE
Refills: 0 | Status: COMPLETED | OUTPATIENT
Start: 2023-10-11 | End: 2023-10-11

## 2023-10-11 RX ORDER — SODIUM CHLORIDE 9 MG/ML
1000 INJECTION, SOLUTION INTRAVENOUS
Refills: 0 | Status: DISCONTINUED | OUTPATIENT
Start: 2023-10-11 | End: 2023-10-12

## 2023-10-11 RX ADMIN — Medication 975 MILLIGRAM(S): at 17:17

## 2023-10-11 RX ADMIN — Medication 975 MILLIGRAM(S): at 09:41

## 2023-10-11 RX ADMIN — SODIUM CHLORIDE 1000 MILLILITER(S): 9 INJECTION, SOLUTION INTRAVENOUS at 12:10

## 2023-10-11 RX ADMIN — SODIUM CHLORIDE 3 MILLILITER(S): 9 INJECTION INTRAMUSCULAR; INTRAVENOUS; SUBCUTANEOUS at 12:19

## 2023-10-11 RX ADMIN — Medication 975 MILLIGRAM(S): at 10:08

## 2023-10-11 RX ADMIN — Medication 30 MILLILITER(S): at 13:36

## 2023-10-11 RX ADMIN — Medication 600 MILLIGRAM(S): at 20:51

## 2023-10-11 RX ADMIN — SODIUM CHLORIDE 125 MILLILITER(S): 9 INJECTION, SOLUTION INTRAVENOUS at 13:46

## 2023-10-11 RX ADMIN — FAMOTIDINE 20 MILLIGRAM(S): 10 INJECTION INTRAVENOUS at 13:36

## 2023-10-11 RX ADMIN — Medication 975 MILLIGRAM(S): at 02:30

## 2023-10-11 RX ADMIN — Medication 600 MILLIGRAM(S): at 05:55

## 2023-10-11 NOTE — OB RN INTRAOPERATIVE NOTE - NSSELHIDDEN_OBGYN_ALL_OB_FT
[NS_DeliveryAttending1_OBGYN_ALL_OB_FT:[email or web address]],[NS_DeliveryRN_OBGYN_ALL_OB_FT:[id number]]

## 2023-10-11 NOTE — OB RN INTRAOPERATIVE NOTE - NSOBSELHIDDEN_OBGYN_ALL_OB_FT
[NSOBAttendingProcedure1_OBGYN_ALL_OB_FT:[email or web address]=],[NSRNCirculatorProcedure1_OBGYN_ALL_OB_FT:[id number]=]

## 2023-10-12 VITALS
RESPIRATION RATE: 18 BRPM | SYSTOLIC BLOOD PRESSURE: 102 MMHG | HEART RATE: 69 BPM | TEMPERATURE: 98 F | OXYGEN SATURATION: 95 % | DIASTOLIC BLOOD PRESSURE: 63 MMHG

## 2023-10-12 PROCEDURE — 85025 COMPLETE CBC W/AUTO DIFF WBC: CPT

## 2023-10-12 PROCEDURE — 85018 HEMOGLOBIN: CPT

## 2023-10-12 PROCEDURE — 86850 RBC ANTIBODY SCREEN: CPT

## 2023-10-12 PROCEDURE — 85014 HEMATOCRIT: CPT

## 2023-10-12 PROCEDURE — 85027 COMPLETE CBC AUTOMATED: CPT

## 2023-10-12 PROCEDURE — 88307 TISSUE EXAM BY PATHOLOGIST: CPT

## 2023-10-12 PROCEDURE — 88302 TISSUE EXAM BY PATHOLOGIST: CPT

## 2023-10-12 PROCEDURE — 86900 BLOOD TYPING SEROLOGIC ABO: CPT

## 2023-10-12 PROCEDURE — 86901 BLOOD TYPING SEROLOGIC RH(D): CPT

## 2023-10-12 PROCEDURE — 36415 COLL VENOUS BLD VENIPUNCTURE: CPT

## 2023-10-12 PROCEDURE — 86780 TREPONEMA PALLIDUM: CPT

## 2023-10-12 RX ORDER — IBUPROFEN 200 MG
1 TABLET ORAL
Qty: 20 | Refills: 0
Start: 2023-10-12 | End: 2023-10-16

## 2023-10-12 RX ORDER — ACETAMINOPHEN 500 MG
2 TABLET ORAL
Qty: 40 | Refills: 0
Start: 2023-10-12 | End: 2023-10-16

## 2023-10-12 RX ADMIN — Medication 600 MILLIGRAM(S): at 12:19

## 2023-10-12 RX ADMIN — Medication 975 MILLIGRAM(S): at 02:55

## 2023-10-12 RX ADMIN — Medication 975 MILLIGRAM(S): at 09:33

## 2023-10-12 NOTE — PROGRESS NOTE ADULT - ATTENDING COMMENTS
Post  day # 1  no complaints  exam wnl    Plan  NPO for BTL  cbc  other routine care  discussed contraception, vaccination, breastfeeding
PP2  POD 1, s/p Mauricio salpingectomy   Hb 13.1   patient BP is normal.  no complaints  denies headache, blurry vision   abd soft, ND,uterus firm ND, dressing removed , wound intact   ext no edema   breast feeding   plan   DC home   wound care discussed   f/up in clinic 2 weeks

## 2023-10-12 NOTE — PROGRESS NOTE ADULT - ASSESSMENT
ASSESSMENT:   MANINDER MICHAUD is a 38y  PPD2 s/p uncomplicated , POD1 from [facility]. Patient has no other complaints at this time, is otherwise clinically and hemodynamically stable.   [place] boy is with patient at bedside, doesn't want circumcision  Hgb: 14.9>13.1  Rub: I/I  #Postpartum  - Continue routine post-partum care  - Pain management PRN  - Encourage maternal- bonding    #BTL  - POD1, uncomplicated    - Diet: Regular, advance as tolerated  - DVT ppx: Ambulation encouraged  - Dispo: Home today per attending's approval,
ASSESSMENT:   MANINDER MICHAUD is a 38y  PPD1 s/p uncomplicated . Patient has no other complaints at this time, is otherwise clinically and hemodynamically stable.   [place] boy is with patient at bedside, doesn't want circumcision  Hgb: 14.9> am labs  Rub: I/I  #Postpartum  - Continue routine post-partum care  - Pain management PRN  - Encourage maternal- bonding    #BTL  - Patient NPO since midnight for PP tubal  - Consent signed and in chart    - Diet: Regular, advance as tolerated  - DVT ppx: Ambulation encouraged  - Dispo: Home today per attending's approval, s/p tubal if it goes

## 2023-10-12 NOTE — PROGRESS NOTE ADULT - SUBJECTIVE AND OBJECTIVE BOX
INTERVAL HPI/OVERNIGHT EVENTS:  38y Female s/p BTL under spinal anesthesia on 10/11/23    Vital Signs Last 24 Hrs  T(C): 36.8 (12 Oct 2023 04:45), Max: 37 (11 Oct 2023 09:01)  T(F): 98.2 (12 Oct 2023 04:45), Max: 98.6 (11 Oct 2023 09:01)  HR: 66 (12 Oct 2023 04:45) (62 - 82)  BP: 98/55 (12 Oct 2023 04:45) (94/58 - 114/74)  BP(mean): --  RR: 18 (12 Oct 2023 04:45) (16 - 18)  SpO2: 94% (12 Oct 2023 04:45) (94% - 100%)    Parameters below as of 12 Oct 2023 04:45  Patient On (Oxygen Delivery Method): room air            Patient's overall anesthesia satisfaction: Positive    Patients pain is well controlled.    No respiratory events overnight    No pruritis at this time    Patient doing well     No headache      No residual numbness or weakness, sensory and motor function intact.    No anesthetic complications or complaints noted or reported          .            
SUBJECTIVE:  Patient seen and examined at bedside this morning. No acute events overnight. Reports feeling well this morning, pain is well controlled with PRN pain medication. Tolerating PO without N/V, voiding spontaneously with no complaints. Minimal pain from umbilical site. Denies fevers, chills, shortness of breath, chest pain, headaches, vision changes or calf pain    OBJECTIVE:  Vital Signs Last 24 Hrs  T(C): 36.8 (12 Oct 2023 00:30), Max: 37 (11 Oct 2023 09:01)  T(F): 98.2 (12 Oct 2023 00:30), Max: 98.6 (11 Oct 2023 09:01)  HR: 74 (12 Oct 2023 00:30) (62 - 82)  BP: 114/74 (12 Oct 2023 00:30) (94/58 - 114/74)      Physical exam:  General: AOx3, NAD.  Heart: S1/S2 with regular rate and normal rhythm.  Lungs: CTABL, no crackles or wheezing.   Abdomen: +BS, Soft, appropriately tender, nondistended, no guarding or rebound tenderness, firm uterine fundus at umbilicus. Umbilical incision with gauze and tegaderm.  Ext: BL LE reveal minimal swelling, no popliteal tenderness or skin changes.     LABS:                        14.9   9.15  )-----------( 247      ( 10 Oct 2023 12:00 )             43.4       Antibody Screen: NEG (10-10-23 @ 12:23)        
SUBJECTIVE:  Patient seen and examined at bedside this morning. No acute events overnight. Reports feeling well this morning, pain is well controlled with PRN pain medication. Tolerating PO without N/V, voiding spontaneously with no complaints. Patient reports changing pads 3 times, appropriate lochia. Denies fevers, chills, shortness of breath, chest pain, headches, vision changes or calf pain    OBJECTIVE:  Vital Signs Last 24 Hrs  T(C): 36.8 (11 Oct 2023 02:32), Max: 36.8 (10 Oct 2023 10:06)  T(F): 98.3 (11 Oct 2023 02:32), Max: 98.3 (11 Oct 2023 02:32)  HR: 69 (11 Oct 2023 02:32) (69 - 103)  BP: 101/60 (11 Oct 2023 02:32) (101/60 - 120/58)  RR: 18 (11 Oct 2023 02:32) (16 - 18)  SpO2: 97% (11 Oct 2023 02:32) (97% - 98%)      Physical exam:  General: AOx3, NAD.  Heart: S1/S2 with regular rate and normal rhythm.  Lungs: CTABL, no crackles or wheezing.   Abdomen: +BS, Soft, appropriately tender, nondistended, no guarding or rebound tenderness, firm uterine fundus at umbilicus  Ext: BL LE reveal minimal swelling, no popliteal tenderness or skin changes.     LABS:                        14.9   9.15  )-----------( 247      ( 10 Oct 2023 12:00 )             43.4       Antibody Screen: NEG (10-10-23 @ 12:23)

## 2023-10-20 LAB
SURGICAL PATHOLOGY STUDY: SIGNIFICANT CHANGE UP
SURGICAL PATHOLOGY STUDY: SIGNIFICANT CHANGE UP

## 2024-01-01 NOTE — OB PROVIDER H&P - NS_OBGYNHISTORY_OBGYN_ALL_OB_FT
Well-Baby Checkup:     Your baby’s first checkup will likely happen within a week of birth. At this  visit, the healthcare provider will examine your baby and ask questions about the first few days at home. This sheet describes some of what you can expect.   Jaundice  All babies develop some yellowing of the skin and the white part of the eyes (jaundice) in the first week of life. Your healthcare provider will advise you if you need to have your baby's bilirubin level checked. Your provider will advise you if your baby needs a follow-up check or needs treatment with phototherapy.   Development and milestones  The healthcare provider will ask questions about your . He or she will watch your baby to get an idea of his or her development. By this visit, your  is likely doing some of the following:   Blinking at a bright light  Trying to lift his or her head  Wiggling and squirming. Each arm and leg should move about the same amount. If the baby favors one side, tell the healthcare provider.  Becoming startled when hearing a loud noise  Feeding tips  It’s normal for a  to lose up to 10% of his or her birth weight during the first week. This is usually gained back by about 2 weeks of age. If you are concerned about your ’s weight, tell the healthcare provider. To help your baby eat well, follow these tips:   Breastmilk is recommended for your baby's first 6 months.   Your baby should not have water unless his or her healthcare provider recommends it.  During the day, feed at least every 2 to 3 hours. You may need to wake your baby for daytime feedings.  At night, feed every 3 to 4 hours. At first, wake your baby for feedings if needed. Once your  is back to his or her birth weight, you may choose to let your baby sleep until he or she is hungry. Discuss this with your baby’s healthcare provider.  Ask the healthcare provider if your baby should take vitamin D.  If you  breastfeed  Once your milk comes in, your breasts should feel full before a feeding and soft and deflated afterward. This likely means that your baby is getting enough to eat.  Breastfeeding sessions usually take  15 to 20 minutes. If you feed the baby breastmilk from a bottle, give 1 to 3 ounces at each feeding.    babies may want to eat more often than every 2 to 3 hours. It’s OK to feed your baby more often if he or she seems hungry. Talk with the healthcare provider if you are concerned about your baby’s breastfeeding habits or weight gain.  It can take some time to get the hang of breastfeeding. It may be uncomfortable at first. If you have questions or need help, a lactation consultant can give you tips.  If you use formula  Use a formula made just for infants. If you need help choosing, ask the healthcare provider for a recommendation. Regular cow's milk is not an appropriate food for a  baby.  Feed around 1 to 3 ounces of formula at each feeding.    Hygiene tips  Some newborns poop (stool) after every feeding. Others stool less often. Both are normal. Change the diaper whenever it’s wet or dirty.  It’s normal for a ’s stool to be yellow, watery, and look like it contains little seeds. The color may range from mustard yellow to pale yellow to green. If it’s another color, tell the healthcare provider.  A boy should have a strong stream when he urinates. If your son doesn’t, tell the healthcare provider.  Give your baby sponge baths until the umbilical cord falls off. If you have questions about caring for the umbilical cord, ask your baby’s healthcare provider.  Follow your healthcare provider's recommendations about how to care for the umbilical cord. This care might include:  Keeping the area clean and dry  Folding down the top of the diaper to expose the umbilical cord to the air  Cleaning the umbilical cord gently with a baby wipe or with a cotton swab dipped in rubbing  alcohol  Call your healthcare provider if the umbilical cord area has pus or redness.  After the cord falls off, bathe your  a few times per week. You may give baths more often if the baby seems to like it. But because you are cleaning the baby during diaper changes, a daily bath often isn’t needed.  It’s OK to use mild (hypoallergenic) creams or lotions on the baby’s skin. Don't put lotion on the baby’s hands.    Sleeping tips  Newborns usually sleep around 18 to 20 hours each day. To help your  sleep safely and soundly and prevent SIDS (sudden infant death syndrome):   Place the infant on his or her back for all sleeping until the child is 1 year of age. This can decrease the risk for SIDS, aspiration, and choking. Never place the baby on his or her side or stomach for sleep or naps. If the baby is awake, allow the child time on his or her tummy as long as there is supervision. This helps the child build strong tummy and neck muscles. This will also help minimize flattening of the head that can happen when babies spend so much time on their backs.  Offer the baby a pacifier for sleeping or naps. If the child is breastfeeding, do not give the baby a pacifier until breastfeeding has been fully established. Breastfeeding is associated with reduced risk of SIDS.  Use a firm mattress (covered by a tight fitted sheet) to prevent gaps between the mattress and the sides of a crib, play yard, or bassinet. This can decrease the risk of entrapment, suffocation, and SIDS.  Don’t put a pillow, heavy blankets, or stuffed animals in the crib. These could suffocate the baby.  Swaddling (wrapping the baby tightly in a blanket) may cause your baby to overheat. Don't let your child get too hot.  Don't place infants on a couch or armchair for sleep. Sleeping on a couch or armchair puts the infant at a much higher risk of death, including SIDS.  Don't use infant seats, car seats, and infant swings for routine sleep and  daily naps. These may lead to obstruction of an infant's airway or suffocation.  Don't share a bed (co-sleep) with your baby. It's not safe.  The American Academy of Pediatrics (AAP) recommends that infants sleep in the same room as their parents, close to their parents' bed, but in a separate bed or crib appropriate for infants. This sleeping arrangement is recommended ideally for the baby's first year, but should at least be maintained for the first 6 months.  Always place cribs, bassinets, and play yards in hazard-free areas--those with no dangling cords, wires, or window coverings--to help decrease strangulation.  Don't use cardiorespiratory monitors and commercial devices--wedges, positioners, and special mattresses--to help decrease the risk for SIDS and sleep-related infant deaths. These devices have not been shown to prevent SIDS. In rare cases, they have resulted in the death of an infant.  Discuss these and other health and safety issues with your baby’s healthcare provider.  Safety tips  To prevent burns, don’t carry or drink hot liquids such as coffee near the baby. Turn the water heater down to a temperature of 120°F (49°C) or below.  Don’t smoke or allow others to smoke near the baby. If you or other family members smoke, do so outdoors and never around the baby.  It’s usually fine to take a  out of the house. But stay away from confined, crowded places where germs can spread. You may invite visitors to your home to see your baby, as long as they are not sick.  When you do take the baby outside, don't stay too long in direct sunlight. Keep the baby covered, or seek out the shade.  In the car, always put the baby in a rear-facing car seat. This should be secured in the back seat, according to the car seat’s directions. Never leave your baby alone in the car.  Do not leave your baby on a high surface, such as a table, bed, or couch. He or she could fall and get hurt.  Older siblings will likely  want to hold, play with, and get to know the baby. This is fine as long as an adult supervises.  Call the healthcare provider right away if your baby has a fever (see Fever and children, below)    Fever and children  Use a digital thermometer to check your child’s temperature. Don’t use a mercury thermometer. There are different kinds and uses of digital thermometers. They include:   Rectal. For children younger than 3 years, a rectal temperature is the most accurate.  Forehead (temporal). This works for children age 3 months and older. If a child under 3 months old has signs of illness, this can be used for a first pass. The provider may want to confirm with a rectal temperature.  Ear (tympanic). Ear temperatures are accurate after 6 months of age, but not before.  Armpit (axillary). This is the least reliable but may be used for a first pass to check a child of any age with signs of illness. The provider may want to confirm with a rectal temperature.  Mouth (oral). Don’t use a thermometer in your child’s mouth until he or she is at least 4 years old.  Use the rectal thermometer with care. Follow the product maker’s directions for correct use. Insert it gently. Label it and make sure it’s not used in the mouth. It may pass on germs from the stool. If you don’t feel OK using a rectal thermometer, ask the healthcare provider what type to use instead. When you talk with any healthcare provider about your child’s fever, tell him or her which type you used.   Below are guidelines to know if your young child has a fever. Your child’s healthcare provider may give you different numbers for your child. Follow your provider’s specific instructions.   Fever readings for a baby under 3 months old:   First, ask your child’s healthcare provider how you should take the temperature.  Rectal or forehead: 100.4°F (38°C) or higher  Armpit: 99°F (37.2°C) or higher  Fever readings for a child age 3 months to 36 months (3 years):    Rectal, forehead, or ear: 102°F (38.9°C) or higher  Armpit: 101°F (38.3°C) or higher  Call the healthcare provider in these cases:   Repeated temperature of 104°F (40°C) or higher in a child of any age  Fever of 100.4° (38°C) or higher in baby younger than 3 months  Fever that lasts more than 24 hours in a child under age 2  Fever that lasts for 3 days in a child age 2 or older  Vaccines  Based on recommendations from the AAP, at this visit your baby may get the hepatitis B vaccine if he or she did not already get it in the hospital.   Parental fatigue: A tiring problem  Taking care of a  can be physically and emotionally draining. Right now it may seem like you have time for nothing else. But taking good care of yourself will help you care for your baby too. Here are some tips:   Take a break. When your baby is sleeping, take a little time for yourself. Lie down for a nap or put up your feet and rest. Know when to say “no” to visitors. Until you feel rested, ignore household clutter and put off nonessential tasks. Give yourself time to settle into your new role as a parent.  Eat healthy. Good nutrition gives you energy. And if you have just given birth, healthy eating helps your body recover. Try to eat a variety of fruits, vegetables, grains, and sources of protein. Stay away from processed “junk” foods. And limit caffeine, especially if you’re breastfeeding. Stay hydrated by drinking plenty of water.  Accept help. Caring for a new baby can be overwhelming. Don’t be afraid to ask others for help. Allow family and friends to help with the housework, meals, and laundry, so you and your partner have time to bond with your new baby. If you need more help, talk to the healthcare provider about other options.  Next checkup at: _______________________________  PARENT NOTES:  [employer] last reviewed this educational content on 3/1/2020  © 2208-6275 [employer]. All rights reserved. This information  is not intended as a substitute for professional medical care. Always follow your healthcare professional's instructions.     5 S's for Soothing Baby = Suck + Swaddle + Side-lying + Sway + Shush     [employer]:  Serves families caring for their fussy babies. Phone calls available in English and Georgian. Open M-F 9am-5pm.  Call [phone]    NSVDx2 uncomplicated  6/21/2003 8/17/2010

## 2024-12-16 NOTE — OB RN INTRAOPERATIVE NOTE - NSDRAINS1_OBGYN_ALL_OB
No Detail Level: Detailed Quality 47: Advance Care Plan: Advance Care Planning discussed and documented in the medical record; patient did not wish or was not able to name a surrogate decision maker or provide an advance care plan. Quality 226: Preventive Care And Screening: Tobacco Use: Screening And Cessation Intervention: Patient screened for tobacco use and is an ex/non-smoker